# Patient Record
Sex: MALE | Race: OTHER | NOT HISPANIC OR LATINO
[De-identification: names, ages, dates, MRNs, and addresses within clinical notes are randomized per-mention and may not be internally consistent; named-entity substitution may affect disease eponyms.]

---

## 2020-03-17 PROBLEM — Z00.00 ENCOUNTER FOR PREVENTIVE HEALTH EXAMINATION: Status: ACTIVE | Noted: 2020-03-17

## 2020-04-03 ENCOUNTER — APPOINTMENT (OUTPATIENT)
Dept: OTOLARYNGOLOGY | Facility: CLINIC | Age: 35
End: 2020-04-03

## 2020-05-26 ENCOUNTER — APPOINTMENT (OUTPATIENT)
Dept: OTOLARYNGOLOGY | Facility: CLINIC | Age: 35
End: 2020-05-26
Payer: MEDICAID

## 2020-05-26 ENCOUNTER — APPOINTMENT (OUTPATIENT)
Dept: OTOLARYNGOLOGY | Facility: CLINIC | Age: 35
End: 2020-05-26

## 2020-05-26 VITALS
OXYGEN SATURATION: 98 % | TEMPERATURE: 98.2 F | BODY MASS INDEX: 24.38 KG/M2 | DIASTOLIC BLOOD PRESSURE: 80 MMHG | HEIGHT: 72 IN | WEIGHT: 180 LBS | HEART RATE: 71 BPM | SYSTOLIC BLOOD PRESSURE: 120 MMHG

## 2020-05-26 DIAGNOSIS — H91.90 UNSPECIFIED HEARING LOSS, UNSPECIFIED EAR: ICD-10-CM

## 2020-05-26 DIAGNOSIS — H93.13 TINNITUS, BILATERAL: ICD-10-CM

## 2020-05-26 DIAGNOSIS — J34.2 DEVIATED NASAL SEPTUM: ICD-10-CM

## 2020-05-26 DIAGNOSIS — R43.9 UNSPECIFIED DISTURBANCES OF SMELL AND TASTE: ICD-10-CM

## 2020-05-26 DIAGNOSIS — R09.81 NASAL CONGESTION: ICD-10-CM

## 2020-05-26 PROCEDURE — 99204 OFFICE O/P NEW MOD 45 MIN: CPT | Mod: 25

## 2020-05-26 PROCEDURE — 31231 NASAL ENDOSCOPY DX: CPT

## 2020-05-26 RX ORDER — FLUTICASONE PROPIONATE 50 UG/1
50 SPRAY, METERED NASAL TWICE DAILY
Qty: 1 | Refills: 3 | Status: ACTIVE | COMMUNITY
Start: 2020-05-26 | End: 1900-01-01

## 2020-05-26 NOTE — REVIEW OF SYSTEMS
[Patient Intake Form Reviewed] : Patient intake form was reviewed [Sneezing] : sneezing [Post Nasal Drip] : post nasal drip [Seasonal Allergies] : seasonal allergies [As Noted in HPI] : as noted in HPI [Negative] : Constitutional [de-identified] : clear rhinorrhea, and admits to history with allergies for which he takes Claritin during allergy season [FreeTextEntry1] : all other ROS negative

## 2020-05-26 NOTE — ASSESSMENT
[FreeTextEntry1] : 35M who presents with long term perceived hearing loss changes, and tinnitus, now bilateral.  He also presents with decreased sense of smell.  Nasal endoscopy shows normal exam with evidence of previous turbinectomy. Patient likely has eustachian tube dysfunction in combination with allergies. Will plan for nasal saline rinses as well as nasal steroids for 4-6 weeks.  Repeat Audio 1 week prior to follow up .  I also asked him to start smell retraining and this was explained as well.  If smell disturbance persists, pt will likely need imaging to assess.\par \par Plan:\par - Recommend Neilmed BID\par - Rx Flonase\par - hearing test in 4 weeks\par - follow up in 5 weeks.

## 2020-05-26 NOTE — REASON FOR VISIT
[Initial Evaluation] : an initial evaluation for [FreeTextEntry2] : left hearing loss and ear fullness

## 2020-05-26 NOTE — PROCEDURE
[FreeTextEntry6] : Nasal Endoscopy\par Procedure Note\par \par Pre-operative Diagnosis: nasal congestion; ETD, decreased sense of smell\par Post-operative Diagnosis: normal examination\par Anesthesia: Topical\par Procedure: Bilateral nasal endoscopy\par \par Procedure Details: \par After topical anesthesia and decongestant, the patient was placed in the supine position. The telescope was passed along the left nasal floor to the nasopharynx. It was then passed into the region of the middle meatus, middle turbinate, and the sphenoethmoid region. An identical procedure was performed on the right side. 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 Patient tolerated procedure well.\par Complications: None \par \par No tumors, masses or lesions, eustachian tubes open withou drainage\par

## 2020-07-10 ENCOUNTER — APPOINTMENT (OUTPATIENT)
Dept: OTOLARYNGOLOGY | Facility: CLINIC | Age: 35
End: 2020-07-10